# Patient Record
Sex: FEMALE | Race: WHITE | Employment: OTHER | URBAN - METROPOLITAN AREA
[De-identification: names, ages, dates, MRNs, and addresses within clinical notes are randomized per-mention and may not be internally consistent; named-entity substitution may affect disease eponyms.]

---

## 2017-03-02 ENCOUNTER — TELEPHONE (OUTPATIENT)
Dept: FAMILY MEDICINE CLINIC | Age: 82
End: 2017-03-02

## 2017-03-02 DIAGNOSIS — E04.1 CYST OF THYROID: Primary | ICD-10-CM

## 2017-03-02 NOTE — TELEPHONE ENCOUNTER
Pts daughter called; mom having difficulty swallowing bc of lump on throat; want to have another xray for her mom done at [facility] and then come in for an appt and have the it discussed at an appt with Dr Surinder Clay; please advise

## 2017-03-06 NOTE — TELEPHONE ENCOUNTER
I have ordered another US for the thyroid. Please call pt's daughter and let her know she can schedule this and then come and see us.

## 2017-03-16 ENCOUNTER — HOSPITAL ENCOUNTER (OUTPATIENT)
Dept: ULTRASOUND IMAGING | Age: 82
Discharge: HOME | End: 2017-03-16
Attending: INTERNAL MEDICINE
Payer: MEDICARE

## 2017-03-16 DIAGNOSIS — E04.1 CYST OF THYROID: ICD-10-CM

## 2017-03-16 PROCEDURE — 76536 US EXAM OF HEAD AND NECK: CPT

## 2017-03-22 ENCOUNTER — TELEPHONE (OUTPATIENT)
Dept: FAMILY MEDICINE CLINIC | Age: 82
End: 2017-03-22

## 2017-03-22 NOTE — TELEPHONE ENCOUNTER
Spoke to daughter Jason Ramirez about US Thyroid results for Dr. Miles Uribe. Daughter is aware of need for surgery consult, requests  in [address]. Has not switched to a PCP there as of yet.

## 2017-03-22 NOTE — PROGRESS NOTES
Had nurse Mel Linton call pt's daughter. Results were given to her and my recommendation was to have her see surgery since she has been having increasing difficulty with swallowing and pressure. Nurse spoke to daughter (see other message in chart). Daughter wants to make her own appt since they live a distance from our office. Daughter assured us she would take care of this.

## 2017-03-28 NOTE — TELEPHONE ENCOUNTER
Gave daughter contact information for Dr. Jennifer Rodriguez in [address]. Pt's [employer] does not need an [facility]. Office takes [address] [place] [address] ENT.

## 2017-09-05 DIAGNOSIS — E78.01 FAMILIAL HYPERCHOLESTEROLEMIA: ICD-10-CM

## 2017-09-05 NOTE — TELEPHONE ENCOUNTER
Pt is scheduled for next available appointment on 9/29/17. Needing enough to hold off until then.      Last ov and last fill: 10/06/2016

## 2017-09-06 RX ORDER — SIMVASTATIN 40 MG/1
40 TABLET, FILM COATED ORAL
Qty: 90 TAB | Refills: 2 | Status: SHIPPED | COMMUNITY
Start: 2017-09-06 | End: 2018-04-30 | Stop reason: SDUPTHER

## 2017-09-06 RX ORDER — AMLODIPINE BESYLATE 10 MG/1
10 TABLET ORAL DAILY
Qty: 90 TAB | Refills: 2 | Status: SHIPPED | COMMUNITY
Start: 2017-09-06 | End: 2018-04-30 | Stop reason: SDUPTHER

## 2017-09-06 RX ORDER — FOSINOPRIL SODIUM 20 MG/1
20 TABLET ORAL DAILY
Qty: 90 TAB | Refills: 2 | Status: SHIPPED | COMMUNITY
Start: 2017-09-06 | End: 2018-04-28 | Stop reason: SDUPTHER

## 2017-09-29 ENCOUNTER — HOSPITAL ENCOUNTER (OUTPATIENT)
Dept: LAB | Age: 82
Discharge: HOME | End: 2017-09-29

## 2017-09-29 ENCOUNTER — OFFICE VISIT (OUTPATIENT)
Dept: FAMILY MEDICINE CLINIC | Age: 82
End: 2017-09-29

## 2017-09-29 VITALS
DIASTOLIC BLOOD PRESSURE: 60 MMHG | OXYGEN SATURATION: 97 % | RESPIRATION RATE: 20 BRPM | HEART RATE: 64 BPM | TEMPERATURE: 97.5 F | HEIGHT: 60 IN | WEIGHT: 136.6 LBS | SYSTOLIC BLOOD PRESSURE: 148 MMHG | BODY MASS INDEX: 26.82 KG/M2

## 2017-09-29 DIAGNOSIS — Z23 ENCOUNTER FOR IMMUNIZATION: ICD-10-CM

## 2017-09-29 DIAGNOSIS — E78.00 PURE HYPERCHOLESTEROLEMIA: Primary | ICD-10-CM

## 2017-09-29 DIAGNOSIS — E78.00 PURE HYPERCHOLESTEROLEMIA: ICD-10-CM

## 2017-09-29 DIAGNOSIS — E55.9 UNSPECIFIED VITAMIN D DEFICIENCY: ICD-10-CM

## 2017-09-29 DIAGNOSIS — I10 ESSENTIAL HYPERTENSION: Primary | ICD-10-CM

## 2017-09-29 PROCEDURE — 99001 SPECIMEN HANDLING PT-LAB: CPT | Performed by: INTERNAL MEDICINE

## 2017-09-29 NOTE — MR AVS SNAPSHOT
Visit Information Date & Time Provider Department Dept. Phone Encounter #  
 9/29/2017 11:30 AM Neftali Galindo, [place] [phone] [id number] Upcoming Health Maintenance Date Due ZOSTER VACCINE AGE 60> 11/22/1985 GLAUCOMA SCREENING Q2Y 1/22/1991 INFLUENZA AGE 9 TO ADULT 8/1/2017 MEDICARE YEARLY EXAM 10/7/2017 DTaP/Tdap/Td series (2 - Td) 5/9/2021 Allergies as of 9/29/2017  Review Complete On: 9/29/2017 By: Meka Dangelo LPN Severity Noted Reaction Type Reactions Doxycycline  04/13/2010    Unknown (comments) Pcn [Penicillins]  04/13/2010    Unknown (comments) Sulfur  04/13/2010    Unknown (comments) Current Immunizations  Reviewed on 8/25/2015 Name Date Influenza High Dose Vaccine PF 10/6/2016 Influenza Vaccine 8/25/2015 10:45 AM, 11/18/2014 Influenza Vaccine Split 11/15/2010 Influenza Vaccine Whole 9/26/2012 Pneumococcal Conjugate (PCV-13) 8/25/2015 10:43 AM  
 TDAP Vaccine 5/9/2011 ZZZ-RETIRED (DO NOT USE) Pneumococcal Vaccine (Unspecified Type) 4/7/2008 Not reviewed this visit You Were Diagnosed With   
  
 Codes Comments Unspecified vitamin D deficiency    -  Primary ICD-10-CM: E55.9 ICD-9-CM: 268.9 Vitals BP Pulse Temp Resp Height(growth percentile) Weight(growth percentile) 148/60 (BP 1 Location: Left arm, BP Patient Position: Sitting) 64 97.5 °F (36.4 °C) (Oral) 20 5' (1.524 m) 136 lb 9.6 oz (62 kg) SpO2 BMI OB Status Smoking Status 97% 26.68 kg/m2 Postmenopausal Never Smoker BMI and BSA Data Body Mass Index Body Surface Area  
 26.68 kg/m 2 1.62 m 2 Preferred Pharmacy Pharmacy Name Phone [place] [id number] [facility] [address] [place] [address] [phone] Your Updated Medication List  
  
   
This list is accurate as of: 9/29/17 11:42 AM.  Always use your most recent med list.  
  
  
  
  
 amLODIPine 10 mg tablet Commonly known as:  Paulino Fanny Take 1 Tab by mouth daily. CALTRATE PLUS Tab Generic drug:  calcium carb-vit d2-minerals Take  by mouth. FISH OIL 1,000 mg Cap Generic drug:  omega-3 fatty acids-vitamin e Take  by mouth. fosinopril 20 mg tablet Commonly known as:  MONOPRIL Take 1 Tab by mouth daily. simvastatin 40 mg tablet Commonly known as:  ZOCOR Take 1 Tab by mouth nightly. VITAMIN D3 1,000 unit Cap Generic drug:  cholecalciferol Take  by mouth. To-Do List   
 09/29/2017 Lab:  VITAMIN D, 25 HYDROXY Introducing [facility]! [employer] introduces "[employer] patient portal. Now you can access parts of your medical record, email your doctor's office, and request medication refills online. 1. In your internet browser, go to [email or web address] 2. Click on the First Time User? Click Here link in the Sign In box. You will see the New Member Sign Up page. 3. Enter your "[employer] Access Code exactly as it appears below. You will not need to use this code after youve completed the sign-up process. If you do not sign up before the expiration date, you must request a new code. · "[employer] Access Code: [email or web address] Expires: 12/28/2017 11:42 AM 
 
4. Enter the last four digits of your Social Security Number (xxxx) and Date of Birth (mm/dd/yyyy) as indicated and click Submit. You will be taken to the next sign-up page. 5. Create a Vital Connect[employer] ID. This will be your "[employer] login ID and cannot be changed, so think of one that is secure and easy to remember. 6. Create a "[employer] password. You can change your password at any time. 7. Enter your Password Reset Question and Answer. This can be used at a later time if you forget your password. 8. Enter your e-mail address. You will receive e-mail notification when new information is available in [address] 9. Click Sign Up. You can now view and download portions of your medical record. 10. Click the Download Summary menu link to download a portable copy of your medical information. If you have questions, please visit the Frequently Asked Questions section of the [employer] website. Remember, [employer] is NOT to be used for urgent needs. For medical emergencies, dial 911. Now available from your iPhone and Android! Please provide this summary of care documentation to your next provider. Your primary care clinician is listed as Tyler Witt. If you have any questions after today's visit, please call [phone].

## 2017-09-29 NOTE — PROGRESS NOTES
Assessment/Plan:    *Diagnoses and all orders for this visit:    1. Essential hypertension    2. Unspecified vitamin D deficiency  -     VITAMIN D, 25 HYDROXY; Future    3. Encounter for immunization  -     Influenza virus vaccine ([place]) 72 years and older ([address])  -     Administration fee () for Medicare insured patients    4. Pure hypercholesterolemia        Will return in 1 year. The plan was discussed with the patient. The patient verbalized understanding and is in agreement with the plan. All medication potential side effects were discussed with the patient.    -------------------------------------------------------------------------------------------------------------------        Bernice Sanderson is a 80 y.o. female and presents with Medication Evaluation and Medication Refill         Subjective:  Pt here for f/u. HTN: well controlled, for her age. HLD: needs repeating. Vit D: has been low in past.  On supplement. Will repeat. ROS:  Constitutional: No recent weight change. No weakness/fatigue. No f/c. Skin: No rashes, change in nails/hair, itching   HENT: No HA, dizziness. No hearing loss/tinnitus. No nasal congestion/discharge. Eyes: No change in vision, double/blurred vision or eye pain/redness. Cardiovascular: No CP/palpitations. No VALDEZ/orthopnea/PND. Respiratory: No cough/sputum, dyspnea, wheezing. Gastointestinal: No dysphagia, reflux. No n/v. No constipation/diarrhea. No melena/rectal bleeding. Genitourinary: No dysuria, urinary hesitancy, nocturia, hematuria. No incontinence. Musculoskeletal: No joint pain/stiffness. No muscle pain/tenderness. Endo: No heat/cold intolerance, no polyuria/polydypsia. Heme: No h/o anemia. No easy bleeding/bruising. Allergy/Immunology: No seasonal rhinitis. Denies frequent colds, sinus/ear infections. Neurological: No seizures/numbness/weakness. No paresthesias. Psychiatric:  No depression, anxiety. The problem list was updated as a part of today's visit. Patient Active Problem List   Diagnosis Code    Hypertension I10    Hyperlipidemia E78.5    Valvular heart disease I38    CAD (coronary artery disease) I25.10    Cardiac polyp I51.89    CRI (chronic renal insufficiency) N18.9    Hemorrhoids, internal K64.8    Diverticulosis K57.90    Pneumonia J18.9    Contact dermatitis and other eczema, due to unspecified cause L25.9    Colon polyps K63.5    Osteopenia M85.80    Hyperglycemia R73.9    Unspecified vitamin D deficiency E55.9    Cyst, thyroid E04.1       The PSH, FH were reviewed. SH:  Social History   Substance Use Topics    Smoking status: Never Smoker    Smokeless tobacco: Never Used    Alcohol use No       Medications/Allergies:  Current Outpatient Prescriptions on File Prior to Visit   Medication Sig Dispense Refill    simvastatin (ZOCOR) 40 mg tablet Take 1 Tab by mouth nightly. 90 Tab 2    fosinopril (MONOPRIL) 20 mg tablet Take 1 Tab by mouth daily. 90 Tab 2    amLODIPine (NORVASC) 10 mg tablet Take 1 Tab by mouth daily. 90 Tab 2    omega-3 fatty acids-vitamin e (FISH OIL) 1,000 mg Cap Take  by mouth.  calcium carb-vit d2-minerals (CALTRATE PLUS) Tab Take  by mouth.  Cholecalciferol, Vitamin D3, (VITAMIN D) 1,000 unit Cap Take  by mouth. No current facility-administered medications on file prior to visit.          Allergies   Allergen Reactions    Doxycycline Unknown (comments)    Pcn [Penicillins] Unknown (comments)    Sulfur Unknown (comments)         Health Maintenance:   Health Maintenance   Topic Date Due    ZOSTER VACCINE AGE 60>  11/22/1985    GLAUCOMA SCREENING Q2Y  01/22/1991    INFLUENZA AGE 9 TO ADULT  08/01/2017    MEDICARE YEARLY EXAM  10/07/2017    DTaP/Tdap/Td series (2 - Td) 05/09/2021    OSTEOPOROSIS SCREENING (DEXA)  Completed    Pneumococcal 65+ Low/Medium Risk  Completed       Objective:  Visit Vitals    /60 (BP 1 Location: Left arm, BP Patient Position: Sitting)    Pulse 64    Temp 97.5 °F (36.4 °C) (Oral)    Resp 20    Ht 5' (1.524 m)    Wt 136 lb 9.6 oz (62 kg)    SpO2 97%    BMI 26.68 kg/m2          Nurses notes and VS reviewed. Physical Examination: General appearance - alert, well appearing, and in no distress  Chest - clear to auscultation, no wheezes, rales or rhonchi, symmetric air entry  Heart - normal rate and regular rhythm, systolic murmur   Abdomen - soft, nontender, nondistended, no masses or organomegaly  Musculoskeletal - no joint tenderness, deformity or swelling  Extremities - peripheral pulses normal, no pedal edema, no clubbing or cyanosis        Labwork and Ancillary Studies:    CBC w/Diff  Lab Results   Component Value Date/Time    WBC 8.6 08/25/2015 10:22 AM    HGB 12.5 08/25/2015 10:22 AM    PLATELET 275 52/13/4559 10:22 AM         Basic Metabolic Profile  Lab Results   Component Value Date/Time    Sodium 143 08/25/2015 10:22 AM    Potassium 4.9 08/25/2015 10:22 AM    Chloride 100 08/25/2015 10:22 AM    CO2 25 08/25/2015 10:22 AM    Anion gap 8 07/21/2015 09:36 AM    Glucose 110 08/25/2015 10:22 AM    BUN 18 08/25/2015 10:22 AM    Creatinine 1.34 08/25/2015 10:22 AM    BUN/Creatinine ratio 13 08/25/2015 10:22 AM    GFR est AA 41 08/25/2015 10:22 AM    GFR est non-AA 35 08/25/2015 10:22 AM    Calcium 10.7 08/25/2015 10:22 AM         LFT  Lab Results   Component Value Date/Time    ALT (SGPT) 9 08/25/2015 10:22 AM    AST (SGOT) 15 08/25/2015 10:22 AM    Alk.  phosphatase 92 08/25/2015 10:22 AM    Bilirubin, direct 0.18 08/25/2015 10:22 AM    Bilirubin, total 0.7 08/25/2015 10:22 AM         Cholesterol  Lab Results   Component Value Date/Time    Cholesterol, total 202 08/25/2015 10:22 AM    HDL Cholesterol 67 08/25/2015 10:22 AM    LDL, calculated 98 08/25/2015 10:22 AM    Triglyceride 186 08/25/2015 10:22 AM    CHOL/HDL Ratio 3.7 07/13/2010 09:55 AM

## 2017-09-29 NOTE — PROGRESS NOTES
Valentine Celeste is a 80 y.o. female states she is here for a general check up. 1. Have you been to the ER, urgent care clinic since your last visit? Hospitalized since your last visit? No    2. Have you seen or consulted any other health care providers outside of [facility] [place] since your last visit? Include any pap smears or colon screening. ENT that we referred her to for lump in throat     Health Maintenance Due   Topic Date Due    ZOSTER VACCINE AGE 60>  11/22/1985    GLAUCOMA SCREENING Q2Y  01/22/1991    INFLUENZA AGE 9 TO ADULT  08/01/2017       Per verbal orders of Dr. Leni Ryan, injection of high dose flu given by Salome Harris LPN. Patient instructed to remain in clinic for 20 minutes afterwards, and to report any adverse reaction to me immediately. Vaccine documentation completed. Tolerated well. Consent signed.

## 2017-09-30 LAB — 25(OH)D3+25(OH)D2 SERPL-MCNC: 77.4 NG/ML (ref 30–100)

## 2018-08-07 ENCOUNTER — PATIENT OUTREACH (OUTPATIENT)
Dept: FAMILY MEDICINE CLINIC | Age: 83
End: 2018-08-07

## 2018-08-07 NOTE — PROGRESS NOTES
Hospital Discharge Follow-Up      Date/Time:  8/7/2018 1:33 PM    Patient was admitted to [facility] on 8/5/18 and discharged on 8/6/18 for Syncope. The physician discharge summary was available at the time of outreach. Patient was contacted within 1 business days of discharge. Patient Outreach made to patient. I spoke with her daughter Marika Julian. She states her mother is doing fine. She has gone to [place] with her son-in-law. He will try to get her scheduled with a pcp there. She is hoping there will be no problem with finding a new pcp with patient's insurance. If everything works out Ms. Mago Arora will stay in [place]. Cherry Colin will be going there in 2 weeks. If needed she will bring her mother back. Cherry Colin will contact our office for an appointment or to request medical records. She has our contact number if assistance is needed. Hospital Course:  HPI is per Dr. Baig Britton is a very pleasant 80 y.o. female who presented to the ED for evaluation of syncope. She went out to dinner with family. After dinner, she was walking to her apartment, developed the acute onset of dizziness, and \"fell slowly\" to the ground onto her right knee (no injury). Her son in law helped her up causing her to have a second similar episode. Her daughter described her as having \"eyes glazed over and not responding, but not really out. \"  Her son in law helped her to the couch in her apartment when she had a third episode. She has never had a similar experience. She denies any changes to her usual daily activities lately. She denies fever, chills, fatigue, malaise, cough, SOB, VALDEZ, chest pain, abdominal pain, N/V/D/C. Her daughter states she is cold all the time, but has noticed no unusual changes. She is due to leave for [place] on Monday.          In the ED, initial evaluation with EKG, cardiac enzymes, metabolic panel is unremarkable other than an elevated SCr to 1.7 (unknown baseline). Admission is warranted for further evaluation. Patient was distended and IV fluids blood pressure medications were held and she was ruled out for ACS. She also had a d-dimer which was elevated and had an MRA of the chest was negative for PE. She also had an echocardiogram that showed EF of 60% with normal left ventricular function and no regional wall motion abnormalities there was no left-to-right shunt ASD or VSD or any other abnormalities noted.     She will be discharged home with outpatient follow-up.            Top Challenges reviewed with the provider   Patient may be relocating to [facility]. She has gone to there now. If things work out she will not return. Seen in hospital for dizziness. Heart ruled out. Method of communication with provider :chart routing    Inpatient RRAT score: Medium Risk            13       Total Score        13 Charlson Comorbidity Score (Age + Comorbid Conditions)        Criteria that do not apply:    Has Seen PCP in Last 6 Months (Yes=3, No=0)    . Living with Significant Other. Assisted Living. LTAC. SNF. or   Rehab    Patient Length of Stay (>5 days = 3)    IP Visits Last 12 Months (1-3=4, 4=9, >4=11)    Pt. Coverage (Medicare=5 , Medicaid, or Self-Pay=4)        Was this a readmission? no   Patient stated reason for the readmission: n/a    Panel Manager contacted the family by telephone to perform post hospital discharge assessment. Verified name and  with family as identifiers. Provided introduction to self, and explanation of the Panel Manager role. Reviewed discharge instructions and red flags with family who verbalized understanding. Family given an opportunity to ask questions and does not have any further questions or concerns at this time. The family agrees to contact the PCP office for questions related to their healthcare. Panel Manager provided contact information for future reference.     Disease Specific:   N/A    Summary of patient's top problems:  1. dizzines  2.   3.     Home Health orders at discharge: no  [address]: n/a  Date of initial visit: n/a    Durable Medical Equipment ordered/company: nno  Durable Medical Equipment received: none    Barriers to care? none    Advance Care Planning:   Does patient have an Advance Directive:  not on file     Medication(s):   New Medications at Discharge: none  Changed Medications at Discharge: none  Discontinued Medications at Discharge: none    Medication reconciliation was performed with family, who verbalizes understanding of administration of home medications. There were no barriers to obtaining medications identified at this time. Referral to Pharm D needed: no     Current Outpatient Prescriptions   Medication Sig    fosinopril (MONOPRIL) 20 mg tablet TAKE 1 TABLET EVERY DAY    simvastatin (ZOCOR) 40 mg tablet TAKE 1 TABLET EVERY NIGHT    amLODIPine (NORVASC) 10 mg tablet TAKE 1 TABLET EVERY DAY    omega-3 fatty acids-vitamin e (FISH OIL) 1,000 mg Cap Take  by mouth.  calcium carb-vit d2-minerals (CALTRATE PLUS) Tab Take  by mouth.  Cholecalciferol, Vitamin D3, (VITAMIN D) 1,000 unit Cap Take  by mouth. No current facility-administered medications for this visit. There are no discontinued medications. [facility] follow up appointment(s): No future appointments.    Non-[facility] follow up appointment(s): n/a  [facility]:  n/a     No Goals set    Goals     None

## 2018-08-07 NOTE — Clinical Note
[facility]. Daughter states Ms. Mago Arora is in [place] with her son in law. He will try to get her with a new pcp there. If everything works out she will stay in [place]. Her daughter will call office about an appointment in a couple of weeks if needed.

## 2018-08-22 ENCOUNTER — PATIENT OUTREACH (OUTPATIENT)
Dept: FAMILY MEDICINE CLINIC | Age: 83
End: 2018-08-22

## 2018-09-06 ENCOUNTER — PATIENT OUTREACH (OUTPATIENT)
Dept: FAMILY MEDICINE CLINIC | Age: 83
End: 2018-09-06

## 2018-09-06 NOTE — PROGRESS NOTES
Patient has graduated from the Transitions of Care Coordination  program on 9/6/18. Patient's symptoms are stable at this time. Patient/family has the ability to self-manage. Care management goals have been completed at this time. No further nurse navigator follow up scheduled. Goals Addressed     None          Pt has nurse navigator's contact information for any further questions, concerns, or needs. Patients upcoming visits:  No future appointments.